# Patient Record
Sex: MALE | Race: WHITE | NOT HISPANIC OR LATINO | Employment: UNEMPLOYED | URBAN - METROPOLITAN AREA
[De-identification: names, ages, dates, MRNs, and addresses within clinical notes are randomized per-mention and may not be internally consistent; named-entity substitution may affect disease eponyms.]

---

## 2019-12-19 ENCOUNTER — HOSPITAL ENCOUNTER (EMERGENCY)
Facility: HOSPITAL | Age: 59
Discharge: HOME | End: 2019-12-19
Attending: EMERGENCY MEDICINE
Payer: MEDICAID

## 2019-12-19 VITALS
SYSTOLIC BLOOD PRESSURE: 96 MMHG | HEART RATE: 63 BPM | RESPIRATION RATE: 18 BRPM | TEMPERATURE: 98 F | HEIGHT: 71 IN | WEIGHT: 160 LBS | OXYGEN SATURATION: 97 % | BODY MASS INDEX: 22.4 KG/M2 | DIASTOLIC BLOOD PRESSURE: 61 MMHG

## 2019-12-19 DIAGNOSIS — S61.421A LACERATION OF RIGHT HAND WITH FOREIGN BODY, INITIAL ENCOUNTER: Primary | ICD-10-CM

## 2019-12-19 DIAGNOSIS — T14.90XA TRAUMA: ICD-10-CM

## 2019-12-19 PROCEDURE — 12002 RPR S/N/AX/GEN/TRNK2.6-7.5CM: CPT | Mod: RT

## 2019-12-19 PROCEDURE — 99283 EMERGENCY DEPT VISIT LOW MDM: CPT | Mod: 25

## 2019-12-19 PROCEDURE — 25000003 PHARM REV CODE 250: Performed by: NURSE PRACTITIONER

## 2019-12-19 RX ORDER — LIDOCAINE HYDROCHLORIDE 10 MG/ML
10 INJECTION, SOLUTION EPIDURAL; INFILTRATION; INTRACAUDAL; PERINEURAL
Status: COMPLETED | OUTPATIENT
Start: 2019-12-19 | End: 2019-12-19

## 2019-12-19 RX ADMIN — LIDOCAINE HYDROCHLORIDE 100 MG: 10 INJECTION, SOLUTION EPIDURAL; INFILTRATION; INTRACAUDAL; PERINEURAL at 04:12

## 2019-12-19 NOTE — ED NOTES
Pt presents to ED with laceration to palm of right hand, pt is awake and alert without distress, pt states that he fell down and caught himself with his right hand on the ground

## 2019-12-19 NOTE — ED PROVIDER NOTES
Encounter Date: 12/19/2019       History     Chief Complaint   Patient presents with    Laceration     right palm of hand to right thumb states fell back on to ground thinks fell on tree limb     Presents with complaint of laceration to right hand  Onset PTA pt was picking up tree limbs in his yard and fell back onto a root        Review of patient's allergies indicates:  No Known Allergies  Past Medical History:   Diagnosis Date    Anemia     Cholecystitis     Cholelithiasis     Cirrhosis of liver     Dyslipidemia     Furunculosis     Gastritis     GERD (gastroesophageal reflux disease)     Glaucoma     Hyperbilirubinemia     Hyperkalemia     Hypocalcemia     Labile hypertension     Liver abscess     Pancreatitis     Pancytopenia     Pseudophakia     Vitamin D deficiency      Past Surgical History:   Procedure Laterality Date    adenoids      CHOLECYSTECTOMY      exploratory lap for liver biopsy      EYE SURGERY      cataract OU    FOOT SURGERY       Family History   Problem Relation Age of Onset    Hypertension Father     Cancer Father     Hypertension Mother      Social History     Tobacco Use    Smoking status: Former Smoker    Smokeless tobacco: Never Used   Substance Use Topics    Alcohol use: Yes     Comment: rare    Drug use: No     Review of Systems   Constitutional: Negative for fever.   Respiratory: Negative for cough, shortness of breath and wheezing.    Cardiovascular: Negative for chest pain, palpitations and leg swelling.   Gastrointestinal: Negative for abdominal pain, diarrhea, nausea and vomiting.   Musculoskeletal: Negative for back pain.   Skin: Negative for rash.        Laceration left hand   Neurological: Negative for weakness.       Physical Exam     Initial Vitals [12/19/19 1427]   BP Pulse Resp Temp SpO2   96/61 63 18 97.8 °F (36.6 °C) 97 %      MAP       --         Physical Exam    Constitutional: He appears well-developed and well-nourished.   HENT:    Mouth/Throat: Oropharynx is clear and moist.   Eyes: Conjunctivae are normal.   Neck: Normal range of motion. Neck supple.   Cardiovascular: Normal rate.   Pulmonary/Chest: Breath sounds normal.   Musculoskeletal: Normal range of motion.   Laceration left hand no active bleeding noted  Pt with full ROM all extremities.  No evidence of tendon involvement    Neurological: He is alert and oriented to person, place, and time. No sensory deficit. GCS score is 15. GCS eye subscore is 4. GCS verbal subscore is 5. GCS motor subscore is 6.   Skin: Skin is warm. Capillary refill takes less than 2 seconds.   Psychiatric: He has a normal mood and affect. Thought content normal.         ED Course   Lac Repair  Date/Time: 12/19/2019 4:35 PM  Performed by: Michelle Harper NP  Authorized by: Michelle Harper NP   Body area: upper extremity  Location details: right hand  Laceration length: 5 cm  Contamination: The wound is contaminated.  Foreign bodies: wood  Tendon involvement: none  Nerve involvement: none  Vascular damage: no    Anesthesia:  Local Anesthetic: lidocaine 1% with epinephrine  Anesthetic total: 10 mL  Patient sedated: no  Preparation: Patient was prepped and draped in the usual sterile fashion.  Irrigation solution: saline  Irrigation method: syringe  Amount of cleaning: extensive  Debridement: none  Degree of undermining: none  Skin closure: 4-0 nylon  Number of sutures: 7  Technique: simple  Approximation: close  Approximation difficulty: simple  Dressing: non-stick sterile dressing  Patient tolerance: Patient tolerated the procedure well with no immediate complications        Labs Reviewed - No data to display       Imaging Results          X-Ray Hand 3 View Right (Final result)  Result time 12/19/19 14:59:02   Procedure changed from X-Ray Hand 2 View Right     Final result by Calin Klein MD (12/19/19 14:59:02)                 Impression:      Thenar laceration.  No acute osseous  abnormality.      Electronically signed by: Calin Klein MD  Date:    12/19/2019  Time:    14:59             Narrative:    EXAMINATION:  XR HAND COMPLETE 3 VIEW RIGHT    CLINICAL HISTORY:  laceration/wood; Injury, unspecified, initial encounter    COMPARISON:  None available    FINDINGS:  No acute fracture or malalignment of the right hand.  Soft tissue gas involving the base of thumb.  No definite radiopaque foreign body is seen, although wood is not always readily apparent radiographically joint spaces are maintained.  Vascular calcifications are noted.                                                                 Clinical Impression:       ICD-10-CM ICD-9-CM   1. Laceration of right hand with foreign body, initial encounter S61.421A 882.1   2. Trauma T14.90XA 959.9                         Michelle Harper NP  12/19/19 [id number]

## 2019-12-19 NOTE — ED NOTES
Pt discharged to home, no distress noted, discharge instructions reviewed with pt, pt verbalized understanding

## 2023-02-08 PROBLEM — J98.2 PNEUMOMEDIASTINUM: Status: ACTIVE | Noted: 2023-02-08

## 2023-02-08 PROBLEM — R62.7 FAILURE TO THRIVE IN ADULT: Status: ACTIVE | Noted: 2023-02-08

## 2023-02-08 PROBLEM — G93.41 ENCEPHALOPATHY, METABOLIC: Status: ACTIVE | Noted: 2023-02-08

## 2023-02-08 PROBLEM — F19.10 POLYSUBSTANCE ABUSE: Status: ACTIVE | Noted: 2023-02-08

## 2023-02-08 PROBLEM — N17.9 ACUTE RENAL FAILURE: Status: ACTIVE | Noted: 2023-02-08

## 2023-02-08 PROBLEM — R74.02 ELEVATED SERUM LACTATE DEHYDROGENASE: Status: ACTIVE | Noted: 2023-02-08

## 2023-02-08 PROBLEM — E11.10 DKA (DIABETIC KETOACIDOSIS): Status: ACTIVE | Noted: 2023-02-08

## 2023-02-08 PROBLEM — E87.20 LACTIC ACID ACIDOSIS: Status: ACTIVE | Noted: 2023-02-08

## 2023-02-09 PROBLEM — E87.0 HYPERNATREMIA: Status: ACTIVE | Noted: 2023-02-09

## 2023-02-10 PROBLEM — R79.89 ELEVATED TROPONIN: Status: ACTIVE | Noted: 2023-02-10

## 2023-02-14 PROBLEM — E43 SEVERE MALNUTRITION: Status: ACTIVE | Noted: 2023-02-14

## 2023-02-16 ENCOUNTER — TELEPHONE (OUTPATIENT)
Dept: CARDIOLOGY | Facility: CLINIC | Age: 63
End: 2023-02-16
Payer: OTHER GOVERNMENT

## 2023-02-16 NOTE — TELEPHONE ENCOUNTER
----- Message from Amber Gordon LPN sent at 2/15/2023 12:35 PM CST -----    ----- Message -----  From: Nazanin Baeza  Sent: 2/15/2023  12:05 PM CST  To: [facility] Staff    Type:  Sooner Appointment Request    Caller is requesting a sooner appointment.  Caller declined first available appointment listed below.  Caller will not accept being placed on the waitlist and is requesting a message be sent to doctor.    Name of Caller:  scarlett/ anais   When is the first available appointment?  4/27  Symptoms:  ed f/u 4 weeks   Best Call Back Number:  [phone] (home)    Additional Information:  pt needs a appt in 4 weeks for a cardiac evaluation please advise thank you

## 2023-02-22 ENCOUNTER — TELEPHONE (OUTPATIENT)
Dept: CARDIOLOGY | Facility: CLINIC | Age: 63
End: 2023-02-22
Payer: MEDICAID

## 2023-02-22 NOTE — TELEPHONE ENCOUNTER
----- Message from Annalee Plaza sent at 2/22/2023 11:15 AM CST -----  Contact: self  Type:  Sooner Appointment Request    Caller is requesting a sooner appointment.   Name of Caller:Pt  When is the first available appointment?n/a  Symptoms:High BP (Hosp Follow Up)  Would the patient rather a call back or a response via [facility]? call  Best Call Back Number:[phone]    Additional Information:  Please call to schedule...    Thank you...

## 2023-03-03 ENCOUNTER — HOSPITAL ENCOUNTER (EMERGENCY)
Facility: HOSPITAL | Age: 63
Discharge: PSYCHIATRIC OR SUBSTANCE USE FACILITY | End: 2023-03-04
Attending: EMERGENCY MEDICINE
Payer: MEDICAID

## 2023-03-03 DIAGNOSIS — F32.A DEPRESSION WITH SUICIDAL IDEATION: Primary | ICD-10-CM

## 2023-03-03 DIAGNOSIS — F19.10 POLYSUBSTANCE ABUSE: ICD-10-CM

## 2023-03-03 DIAGNOSIS — R45.851 DEPRESSION WITH SUICIDAL IDEATION: Primary | ICD-10-CM

## 2023-03-03 DIAGNOSIS — Z00.8 MEDICAL CLEARANCE FOR PSYCHIATRIC ADMISSION: ICD-10-CM

## 2023-03-03 LAB
ALBUMIN SERPL BCP-MCNC: 3.8 G/DL (ref 3.5–5.2)
ALP SERPL-CCNC: 83 U/L (ref 55–135)
ALT SERPL W/O P-5'-P-CCNC: 26 U/L (ref 10–44)
AMPHET+METHAMPHET UR QL: NEGATIVE
ANION GAP SERPL CALC-SCNC: 7 MMOL/L (ref 8–16)
APAP SERPL-MCNC: <10 UG/ML (ref 10–20)
AST SERPL-CCNC: 24 U/L (ref 10–40)
BACTERIA #/AREA URNS HPF: NEGATIVE /HPF
BARBITURATES UR QL SCN>200 NG/ML: NEGATIVE
BASOPHILS # BLD AUTO: 0.07 K/UL (ref 0–0.2)
BASOPHILS NFR BLD: 1.3 % (ref 0–1.9)
BENZODIAZ UR QL SCN>200 NG/ML: NEGATIVE
BILIRUB SERPL-MCNC: 0.8 MG/DL (ref 0.1–1)
BILIRUB UR QL STRIP: NEGATIVE
BUN SERPL-MCNC: 12 MG/DL (ref 8–23)
BZE UR QL SCN: ABNORMAL
CALCIUM SERPL-MCNC: 8.5 MG/DL (ref 8.7–10.5)
CANNABINOIDS UR QL SCN: NEGATIVE
CHLORIDE SERPL-SCNC: 102 MMOL/L (ref 95–110)
CLARITY UR: CLEAR
CO2 SERPL-SCNC: 26 MMOL/L (ref 23–29)
COLOR UR: YELLOW
CREAT SERPL-MCNC: 1.1 MG/DL (ref 0.5–1.4)
CREAT UR-MCNC: 62 MG/DL (ref 23–375)
DIFFERENTIAL METHOD: ABNORMAL
EOSINOPHIL # BLD AUTO: 0.1 K/UL (ref 0–0.5)
EOSINOPHIL NFR BLD: 1.3 % (ref 0–8)
ERYTHROCYTE [DISTWIDTH] IN BLOOD BY AUTOMATED COUNT: 14.4 % (ref 11.5–14.5)
EST. GFR  (NO RACE VARIABLE): >60 ML/MIN/1.73 M^2
ETHANOL SERPL-MCNC: <5 MG/DL
GLUCOSE SERPL-MCNC: 142 MG/DL (ref 70–110)
GLUCOSE SERPL-MCNC: 286 MG/DL (ref 70–110)
GLUCOSE UR QL STRIP: ABNORMAL
HCT VFR BLD AUTO: 34.3 % (ref 40–54)
HGB BLD-MCNC: 10.6 G/DL (ref 14–18)
HGB UR QL STRIP: NEGATIVE
HYALINE CASTS #/AREA URNS LPF: 0 /LPF
IMM GRANULOCYTES # BLD AUTO: 0.02 K/UL (ref 0–0.04)
IMM GRANULOCYTES NFR BLD AUTO: 0.4 % (ref 0–0.5)
KETONES UR QL STRIP: NEGATIVE
LEUKOCYTE ESTERASE UR QL STRIP: NEGATIVE
LYMPHOCYTES # BLD AUTO: 1.1 K/UL (ref 1–4.8)
LYMPHOCYTES NFR BLD: 21.9 % (ref 18–48)
MCH RBC QN AUTO: 29.6 PG (ref 27–31)
MCHC RBC AUTO-ENTMCNC: 30.9 G/DL (ref 32–36)
MCV RBC AUTO: 96 FL (ref 82–98)
MICROSCOPIC COMMENT: ABNORMAL
MONOCYTES # BLD AUTO: 0.5 K/UL (ref 0.3–1)
MONOCYTES NFR BLD: 10.4 % (ref 4–15)
NEUTROPHILS # BLD AUTO: 3.4 K/UL (ref 1.8–7.7)
NEUTROPHILS NFR BLD: 64.7 % (ref 38–73)
NITRITE UR QL STRIP: NEGATIVE
NRBC BLD-RTO: 0 /100 WBC
OPIATES UR QL SCN: NEGATIVE
PCP UR QL SCN>25 NG/ML: NEGATIVE
PH UR STRIP: 7 [PH] (ref 5–8)
PLATELET # BLD AUTO: 180 K/UL (ref 150–450)
PMV BLD AUTO: 9.9 FL (ref 9.2–12.9)
POTASSIUM SERPL-SCNC: 4 MMOL/L (ref 3.5–5.1)
PROT SERPL-MCNC: 6.8 G/DL (ref 6–8.4)
PROT UR QL STRIP: NEGATIVE
RBC # BLD AUTO: 3.58 M/UL (ref 4.6–6.2)
RBC #/AREA URNS HPF: 0 /HPF (ref 0–4)
SALICYLATES SERPL-MCNC: <4 MG/DL (ref 15–30)
SARS-COV-2 RDRP RESP QL NAA+PROBE: NEGATIVE
SODIUM SERPL-SCNC: 135 MMOL/L (ref 136–145)
SP GR UR STRIP: 1.02 (ref 1–1.03)
SQUAMOUS #/AREA URNS HPF: 0 /HPF
TOXICOLOGY INFORMATION: ABNORMAL
TSH SERPL DL<=0.005 MIU/L-ACNC: 2.47 UIU/ML (ref 0.34–5.6)
URN SPEC COLLECT METH UR: ABNORMAL
UROBILINOGEN UR STRIP-ACNC: NEGATIVE EU/DL
WBC # BLD AUTO: 5.21 K/UL (ref 3.9–12.7)
WBC #/AREA URNS HPF: 1 /HPF (ref 0–5)
YEAST URNS QL MICRO: ABNORMAL

## 2023-03-03 PROCEDURE — 80307 DRUG TEST PRSMV CHEM ANLYZR: CPT | Performed by: NURSE PRACTITIONER

## 2023-03-03 PROCEDURE — 82962 GLUCOSE BLOOD TEST: CPT

## 2023-03-03 PROCEDURE — 84443 ASSAY THYROID STIM HORMONE: CPT | Performed by: NURSE PRACTITIONER

## 2023-03-03 PROCEDURE — U0002 COVID-19 LAB TEST NON-CDC: HCPCS | Performed by: EMERGENCY MEDICINE

## 2023-03-03 PROCEDURE — 99285 EMERGENCY DEPT VISIT HI MDM: CPT

## 2023-03-03 PROCEDURE — 80179 DRUG ASSAY SALICYLATE: CPT | Performed by: NURSE PRACTITIONER

## 2023-03-03 PROCEDURE — 80053 COMPREHEN METABOLIC PANEL: CPT | Performed by: NURSE PRACTITIONER

## 2023-03-03 PROCEDURE — 80143 DRUG ASSAY ACETAMINOPHEN: CPT | Performed by: NURSE PRACTITIONER

## 2023-03-03 PROCEDURE — 82077 ASSAY SPEC XCP UR&BREATH IA: CPT | Performed by: NURSE PRACTITIONER

## 2023-03-03 PROCEDURE — 85025 COMPLETE CBC W/AUTO DIFF WBC: CPT | Performed by: NURSE PRACTITIONER

## 2023-03-03 PROCEDURE — 81001 URINALYSIS AUTO W/SCOPE: CPT | Mod: 59 | Performed by: NURSE PRACTITIONER

## 2023-03-04 VITALS
WEIGHT: 150 LBS | BODY MASS INDEX: 21.47 KG/M2 | HEART RATE: 86 BPM | RESPIRATION RATE: 16 BRPM | SYSTOLIC BLOOD PRESSURE: 154 MMHG | TEMPERATURE: 98 F | DIASTOLIC BLOOD PRESSURE: 95 MMHG | OXYGEN SATURATION: 97 % | HEIGHT: 70 IN

## 2023-03-04 PROBLEM — R60.9 EDEMA: Status: ACTIVE | Noted: 2023-03-04

## 2023-03-04 PROBLEM — E55.9 VITAMIN D DEFICIENCY: Status: ACTIVE | Noted: 2023-03-04

## 2023-03-04 PROBLEM — E11.9 DIABETES: Status: ACTIVE | Noted: 2023-03-04

## 2023-03-04 PROBLEM — K74.60 CIRRHOSIS: Status: ACTIVE | Noted: 2023-03-04

## 2023-03-04 PROBLEM — I10 HTN (HYPERTENSION): Status: ACTIVE | Noted: 2023-03-04

## 2023-03-04 NOTE — ED NOTES
Rounding on the patient has been done. he has been updated on the plan of care and his current status. Pain was assessed and is currently a 0/10. Comfort positioning and restroom needs were addressed. he was advised when a reassessment would take place. The patient is resting comfortably on the stretcher, respirations are even and unlabored, skin warm and dry. Sitter outside room maintaining visual contact with patient. Report given to [facility]

## 2023-03-04 NOTE — ED NOTES
Patient awake, alert and denies needs. NAD observed. Call light within reach. Verbalzes understanding instructions to call for assistance. Sitter present at door. [facility] here to get pt to transfer to psych facility.

## 2023-03-04 NOTE — ED PROVIDER NOTES
Encounter Date: 3/3/2023       History     Chief Complaint   Patient presents with    Suicidal     Pt here for help for depression and si, he said his diabetes is getting worse and he just feels like giving up. He has had a previous attempt but states he doesn't have a plan at this time.      62-year-old male with history of alcohol abuse, polysubstance abuse including cocaine and heroin, depression, gastroesophageal reflux, hypertension, pancreatitis, diabetes mellitus.  Patient presents to the emergency department with complaint of increasing depression over last 2-3 weeks.  Patient states that he has been living with his friend and has had a difficult living situation because he has been exposed to more drug opportunities.  Patient states he began using cocaine as well as heroin intermittently over last few weeks.  At this time he is not been taking care of his underlying medical problems.  And feels that he just wants to give up.  Patient has had previous inpatient psychiatric admission for suicide attempt.  Currently this time he feels that he is not safe to be alone.    Review of patient's allergies indicates:  No Known Allergies  Past Medical History:   Diagnosis Date    Alcohol abuse     Amebic liver abscess     Anemia     Cholecystitis     Cholelithiasis     Cirrhosis of liver     Diabetes mellitus     Drug abuse and dependence     Dyslipidemia     Furunculosis     Gastritis     GERD (gastroesophageal reflux disease)     Glaucoma     Hyperbilirubinemia     Hyperkalemia     Hypertension     Hypocalcemia     Labile hypertension     Liver abscess     Liver disease     Pancreatitis     Pancreatitis, chronic     Pancytopenia     Peritoneal abscess     Pseudophakia     Unspecified cirrhosis of liver     Vitamin D deficiency      Past Surgical History:   Procedure Laterality Date    adenoids      CATARACT EXTRACTION      CHOLECYSTECTOMY      exploratory lap for liver biopsy      EYE SURGERY      cataract OU    EYE  SURGERY      FOOT SURGERY      LIVER BIOPSY       Family History   Problem Relation Age of Onset    Hypertension Father     Cancer Father     Hypertension Mother      Social History     Tobacco Use    Smoking status: Some Days     Types: Cigarettes    Smokeless tobacco: Never   Substance Use Topics    Alcohol use: Yes     Comment: rare    Drug use: Yes     Types: Cocaine, Marijuana, Amphetamines     Review of Systems   Constitutional:  Negative for fever.   HENT:  Negative for sore throat.    Respiratory:  Negative for shortness of breath.    Cardiovascular:  Negative for chest pain.   Gastrointestinal:  Negative for nausea and vomiting.   Genitourinary:  Negative for dysuria.   Musculoskeletal:  Negative for back pain.   Skin:  Negative for rash.   Neurological:  Positive for weakness.   Hematological:  Does not bruise/bleed easily.   Psychiatric/Behavioral:  Positive for decreased concentration, self-injury, sleep disturbance and suicidal ideas. The patient is nervous/anxious.      Physical Exam     Initial Vitals [03/03/23 1701]   BP Pulse Resp Temp SpO2   (!) 148/83 68 17 98.3 °F (36.8 °C) 99 %      MAP       --         Physical Exam    Nursing note and vitals reviewed.  Constitutional: He appears well-developed and well-nourished.   Male appearing older than stated age   HENT:   Head: Normocephalic and atraumatic.   Nose: Nose normal.   Mouth/Throat: Oropharynx is clear and moist.   Eyes: Conjunctivae and EOM are normal. Pupils are equal, round, and reactive to light. No scleral icterus.   Neck: Neck supple.   Normal range of motion.  Cardiovascular:  Normal rate, regular rhythm, normal heart sounds and intact distal pulses.     Exam reveals no gallop and no friction rub.       No murmur heard.  Pulmonary/Chest: No stridor. No respiratory distress.   Course bilateral breath sounds no adventitious sounds   Abdominal: Abdomen is soft. Bowel sounds are normal. He exhibits no distension and no mass. There is no  abdominal tenderness. There is no rebound and no guarding.   Musculoskeletal:         General: No edema. Normal range of motion.      Cervical back: Normal range of motion and neck supple.     Lymphadenopathy:     He has no cervical adenopathy.   Neurological: He is alert and oriented to person, place, and time. He has normal strength and normal reflexes. No cranial nerve deficit or sensory deficit. GCS score is 15. GCS eye subscore is 4. GCS verbal subscore is 5. GCS motor subscore is 6.   Skin: Skin is warm and dry. Capillary refill takes less than 2 seconds. No rash noted.   Psychiatric:   Poor insight, poor judgment, flat affect, suicidal ideation       ED Course   Procedures  Labs Reviewed   CBC W/ AUTO DIFFERENTIAL - Abnormal; Notable for the following components:       Result Value    RBC 3.58 (*)     Hemoglobin 10.6 (*)     Hematocrit 34.3 (*)     MCHC 30.9 (*)     All other components within normal limits   COMPREHENSIVE METABOLIC PANEL - Abnormal; Notable for the following components:    Sodium 135 (*)     Glucose 286 (*)     Calcium 8.5 (*)     Anion Gap 7 (*)     All other components within normal limits   URINALYSIS, REFLEX TO URINE CULTURE - Abnormal; Notable for the following components:    Glucose, UA 4+ (*)     All other components within normal limits    Narrative:     Specimen Source->Urine   DRUG SCREEN PANEL, URINE EMERGENCY - Abnormal; Notable for the following components:    Cocaine (Metab.) Presumptive Positive (*)     All other components within normal limits    Narrative:     Specimen Source->Urine   SALICYLATE LEVEL - Abnormal; Notable for the following components:    Salicylate Lvl <4.0 (*)     All other components within normal limits   URINALYSIS MICROSCOPIC - Abnormal; Notable for the following components:    Hyaline Casts, UA 0.00 (*)     All other components within normal limits    Narrative:     Specimen Source->Urine   TSH   ALCOHOL,MEDICAL (ETHANOL)   ACETAMINOPHEN LEVEL    ACETAMINOPHEN LEVEL   ALCOHOL,MEDICAL (ETHANOL)   SALICYLATE LEVEL   TSH   SARS-COV-2 RNA AMPLIFICATION, QUAL          Imaging Results    None          Medications - No data to display  Medical Decision Making:   Initial Assessment:   62-year-old male with history of alcohol abuse, polysubstance abuse including cocaine and heroin, depression, gastroesophageal reflux, hypertension, pancreatitis, diabetes mellitus.  Patient presents to the emergency department with complaint of increasing depression over last 2-3 weeks.  Patient states that he has been living with his friend and has had a difficult living situation because he has been exposed to more drug opportunities.  Patient states he began using cocaine as well as heroin intermittently over last few weeks.  At this time he is not been taking care of his underlying medical problems.  And feels that he just wants to give up.  Patient has had previous inpatient psychiatric admission for suicide attempt.  Currently this time he feels that he is not safe to be alone.    Differential Diagnosis:   Suicidal ideations, depression, acute psychosis,  Clinical Tests:   Lab Tests: Ordered and Reviewed  Radiological Study: Ordered and Reviewed  ED Management:  Patient seen evaluated emergency department.  Currently at this time patient with known history of depression and polysubstance abuse.  Alcoholism.  Patient currently here secondary to suicide ideation and worsening depression and feelings of hopelessness.  Patient at this time was medically cleared and PEC'd for transferred to inpatient psychiatric facility.  Patient remained hemodynamically adequate emergency department.  Awaiting transferred to psychiatric facility.                        Clinical Impression:   Final diagnoses:  [F32.A, R45.851] Depression with suicidal ideation (Primary)  [Z00.8] Medical clearance for psychiatric admission  [F19.10] Polysubstance abuse        ED Disposition Condition    Transfer to  Psych Facility Stable          ED Prescriptions    None       Follow-up Information    None          Luis Enrique Vital MD  03/03/23 3164

## 2023-03-04 NOTE — ED NOTES
Patient awake, alert and denies needs. NAD observed. Call light within reach. Verbalzes understanding instructions to call for assistance. Sitter present at door.

## 2023-05-15 PROBLEM — N17.9 ACUTE RENAL FAILURE: Status: RESOLVED | Noted: 2023-02-08 | Resolved: 2023-05-15

## 2023-05-15 PROBLEM — E11.10 DKA (DIABETIC KETOACIDOSIS): Status: RESOLVED | Noted: 2023-02-08 | Resolved: 2023-05-15
